# Patient Record
Sex: MALE | Race: WHITE
[De-identification: names, ages, dates, MRNs, and addresses within clinical notes are randomized per-mention and may not be internally consistent; named-entity substitution may affect disease eponyms.]

---

## 2018-01-18 ENCOUNTER — HOSPITAL ENCOUNTER (EMERGENCY)
Dept: HOSPITAL 61 - ER | Age: 28
Discharge: HOME | End: 2018-01-18
Payer: SELF-PAY

## 2018-01-18 DIAGNOSIS — F41.9: ICD-10-CM

## 2018-01-18 DIAGNOSIS — J45.909: ICD-10-CM

## 2018-01-18 DIAGNOSIS — J11.1: Primary | ICD-10-CM

## 2018-01-18 DIAGNOSIS — F10.20: ICD-10-CM

## 2018-01-18 PROCEDURE — 99283 EMERGENCY DEPT VISIT LOW MDM: CPT

## 2018-01-18 RX ADMIN — ACETAMINOPHEN 1 MG: 500 TABLET ORAL at 19:55

## 2018-01-18 RX ADMIN — ONDANSETRON 1 MG: 4 TABLET, ORALLY DISINTEGRATING ORAL at 19:55

## 2019-06-26 ENCOUNTER — HOSPITAL ENCOUNTER (EMERGENCY)
Dept: HOSPITAL 61 - ER | Age: 29
LOS: 1 days | Discharge: HOME | End: 2019-06-27
Payer: SELF-PAY

## 2019-06-26 VITALS — SYSTOLIC BLOOD PRESSURE: 114 MMHG | DIASTOLIC BLOOD PRESSURE: 71 MMHG

## 2019-06-26 VITALS — HEIGHT: 70 IN | WEIGHT: 190 LBS | BODY MASS INDEX: 27.2 KG/M2

## 2019-06-26 DIAGNOSIS — J45.909: ICD-10-CM
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
## 2019-06-26 DIAGNOSIS — M54.5: Primary | ICD-10-CM

## 2019-06-26 DIAGNOSIS — F10.20: ICD-10-CM

## 2019-06-26 DIAGNOSIS — F41.9: ICD-10-CM

## 2019-06-26 PROCEDURE — 96372 THER/PROPH/DIAG INJ SC/IM: CPT

## 2019-06-26 PROCEDURE — 99284 EMERGENCY DEPT VISIT MOD MDM: CPT

## 2019-06-27 NOTE — PHYS DOC
Past Medical History


Past Medical History:  Alcoholism, Anxiety, Asthma, Other


Additional Past Medical Histor:  heart palpitations


Past Surgical History:  No Surgical History


Alcohol Use:  Heavy


Drug Use:  None





Adult General


Chief Complaint


Chief Complaint:  LOWER BACK PAIN OR INJURY





[facility]


HPI





Patient is a 29  year old MALE PRESENTS FOR EVAL OF LOW BACK PAIN SINCE SUNDAY. 

REPORTS HE REACHED FOR SOMETHING AND HAS HAD PAIN IN HIS BACK SINCE THAT TIME. 

DENIES FALL. HE SAW A CHIROPRACTOR YESTERDAY AND HAD AN ADJUSTMENT BUT SYMPTOMS 

HAVEN'T RESOLVED. HE FEELS SPASMS IN HIS BACK THAT WAKE HIM UP AT NIGHT.


HE DENIES LOSS OF BLADDER OR BOWEL FUNCTION, NUMBNESS OR TINGLING.





Review of Systems


Review of Systems





Constitutional: Denies fever or chills []


Eyes: Denies change in visual acuity, redness, or eye pain []


HENT: Denies nasal congestion or sore throat []


Respiratory: Denies cough or shortness of breath []


Cardiovascular: No additional information not addressed in HPI []


GI: Denies abdominal pain, nausea, vomiting, bloody stools or diarrhea []


: Denies dysuria or hematuria []


Musculoskeletal: Denies back pain or joint pain []


Integument: Denies rash or skin lesions []


Neurologic: Denies headache, focal weakness or sensory changes []


Endocrine: Denies polyuria or polydipsia []





All other systems were reviewed and found to be within normal limits, except as 

documented in this note.





Current Medications


Current Medications





Current Medications








 Medications


  (Trade)  Dose


 Ordered  Sig/[facility]  Start Time


 Stop Time Status Last Admin


Dose Admin


 


 Ketorolac


 Tromethamine


  (Toradol 30mg


 Vial)  30 mg  1X  ONCE  6/27/19 00:00


 6/27/19 00:01 DC 6/27/19 00:02


30 MG


 


 Methylprednisolone


 Sodium Succinate


  (SOLU-Medrol


 125MG VIAL)  125 mg  1X  ONCE  6/27/19 00:00


 6/27/19 00:01 DC 6/27/19 00:02


125 MG


 


 Orphenadrine


 Citrate


  (Norflex)  60 mg  1X  ONCE  6/27/19 00:00


 6/27/19 00:01 DC 6/27/19 00:02


60 MG











Allergies


Allergies





Allergies








Coded Allergies Type Severity Reaction Last Updated Verified


 


  No Known Drug Allergies    3/8/14 No











Physical Exam


Physical Exam





Constitutional: Well developed, well nourished,  non-toxic appearance, IN PAIN. 

[]


Neck: Normal range of motion, no tenderness, supple, no stridor. [] 


Cardiovascular:Heart rate regular rhythm, no murmur []


Lungs & Thorax:  Bilateral breath sounds clear to auscultation []


Skin: Warm, dry, no erythema, no rash. [] 


Back: TTP DIFFUSE LUMBAR, NO STEP OFFS, PALPABLE SPASM IN LUMBAR, no CVA 

tenderness. [] 


Extremities: No tenderness, no cyanosis, no clubbing, ROM intact, no edema. [] 


Neurologic: Alert and oriented X 3, normal motor function, normal sensory 

function, no focal deficits noted. []


Psychologic: Affect normal, judgement normal, mood normal. []





Current Patient Data


Vital Signs





                                   Vital Signs








  Date Time  Temp Pulse Resp B/P (MAP) Pulse Ox O2 Delivery O2 Flow Rate FiO2


 


6/26/19 23:10 97.9 86 18 114/71 (85) 98 Room Air  





 97.9       











EKG


EKG


[]





Radiology/Procedures


Radiology/Procedures


[]





Course & Med Decision Making


Course & Med Decision Making


Pertinent Labs and Imaging studies reviewed. (See chart for details)





[F/U C PCP 2-3 DAYS, NO S/S OF CAUDA EQUINA]





Dragon Disclaimer


Dragon Disclaimer


This electronic medical record was generated, in whole or in part, using a voice

recognition dictation system.





Departure


Departure


Impression:  


   Primary Impression:  


   Back pain


Disposition:  01 HOME, SELF-CARE


Condition:  STABLE


Referrals:  


NO PCP (PCP)


Patient Instructions:  Back Pain, Adult


Scripts


Orphenadrine Citrate (ORPHENADRINE CITRATE) 100 Mg Tablet.er


1 TAB PO BID, #20 TAB 1 Refill


   Prov: JAY PRADO         6/27/19





Problem Qualifiers








   Primary Impression:  


   Back pain


   Back pain location:  low back pain  Chronicity:  acute  Back pain laterality:

    bilateral  Sciatica presence:  without sciatica  Qualified Codes:  M54.5 - 

   Low back pain








JAY PRADO           Jun 27, 2019 00:05